# Patient Record
Sex: FEMALE | Race: OTHER | NOT HISPANIC OR LATINO | URBAN - METROPOLITAN AREA
[De-identification: names, ages, dates, MRNs, and addresses within clinical notes are randomized per-mention and may not be internally consistent; named-entity substitution may affect disease eponyms.]

---

## 2021-09-23 ENCOUNTER — INPATIENT (INPATIENT)
Facility: HOSPITAL | Age: 41
LOS: 0 days | Discharge: HOME | DRG: 372 | End: 2021-09-24
Attending: HOSPITALIST | Admitting: STUDENT IN AN ORGANIZED HEALTH CARE EDUCATION/TRAINING PROGRAM
Payer: COMMERCIAL

## 2021-09-23 VITALS
DIASTOLIC BLOOD PRESSURE: 67 MMHG | WEIGHT: 169.09 LBS | OXYGEN SATURATION: 100 % | HEART RATE: 93 BPM | HEIGHT: 62 IN | RESPIRATION RATE: 18 BRPM | SYSTOLIC BLOOD PRESSURE: 121 MMHG | TEMPERATURE: 98 F

## 2021-09-23 DIAGNOSIS — K35.80 UNSPECIFIED ACUTE APPENDICITIS: ICD-10-CM

## 2021-09-23 DIAGNOSIS — F90.9 ATTENTION-DEFICIT HYPERACTIVITY DISORDER, UNSPECIFIED TYPE: ICD-10-CM

## 2021-09-23 DIAGNOSIS — J45.909 UNSPECIFIED ASTHMA, UNCOMPLICATED: ICD-10-CM

## 2021-09-23 DIAGNOSIS — K52.9 NONINFECTIVE GASTROENTERITIS AND COLITIS, UNSPECIFIED: ICD-10-CM

## 2021-09-23 DIAGNOSIS — Z98.890 OTHER SPECIFIED POSTPROCEDURAL STATES: Chronic | ICD-10-CM

## 2021-09-23 DIAGNOSIS — Z29.9 ENCOUNTER FOR PROPHYLACTIC MEASURES, UNSPECIFIED: ICD-10-CM

## 2021-09-23 LAB
ALBUMIN SERPL ELPH-MCNC: 4 G/DL — SIGNIFICANT CHANGE UP (ref 3.3–5)
ALP SERPL-CCNC: 86 U/L — SIGNIFICANT CHANGE UP (ref 40–120)
ALT FLD-CCNC: 15 U/L — SIGNIFICANT CHANGE UP (ref 10–45)
ANION GAP SERPL CALC-SCNC: 10 MMOL/L — SIGNIFICANT CHANGE UP (ref 5–17)
APPEARANCE UR: CLEAR — SIGNIFICANT CHANGE UP
APTT BLD: 28.1 SEC — SIGNIFICANT CHANGE UP (ref 27.5–35.5)
AST SERPL-CCNC: 25 U/L — SIGNIFICANT CHANGE UP (ref 10–40)
BASOPHILS # BLD AUTO: 0.03 K/UL — SIGNIFICANT CHANGE UP (ref 0–0.2)
BASOPHILS NFR BLD AUTO: 0.5 % — SIGNIFICANT CHANGE UP (ref 0–2)
BILIRUB SERPL-MCNC: 0.2 MG/DL — SIGNIFICANT CHANGE UP (ref 0.2–1.2)
BILIRUB UR-MCNC: NEGATIVE — SIGNIFICANT CHANGE UP
BLD GP AB SCN SERPL QL: NEGATIVE — SIGNIFICANT CHANGE UP
BLD GP AB SCN SERPL QL: NEGATIVE — SIGNIFICANT CHANGE UP
BUN SERPL-MCNC: 6 MG/DL — LOW (ref 7–23)
CALCIUM SERPL-MCNC: 9.3 MG/DL — SIGNIFICANT CHANGE UP (ref 8.4–10.5)
CHLORIDE SERPL-SCNC: 103 MMOL/L — SIGNIFICANT CHANGE UP (ref 96–108)
CO2 SERPL-SCNC: 24 MMOL/L — SIGNIFICANT CHANGE UP (ref 22–31)
COLOR SPEC: YELLOW — SIGNIFICANT CHANGE UP
CREAT SERPL-MCNC: 0.74 MG/DL — SIGNIFICANT CHANGE UP (ref 0.5–1.3)
CRP SERPL-MCNC: 38 MG/L — HIGH (ref 0–4)
CULTURE RESULTS: SIGNIFICANT CHANGE UP
DIFF PNL FLD: NEGATIVE — SIGNIFICANT CHANGE UP
EOSINOPHIL # BLD AUTO: 0.04 K/UL — SIGNIFICANT CHANGE UP (ref 0–0.5)
EOSINOPHIL NFR BLD AUTO: 0.6 % — SIGNIFICANT CHANGE UP (ref 0–6)
ERYTHROCYTE [SEDIMENTATION RATE] IN BLOOD: 19 MM/HR — HIGH
GLUCOSE SERPL-MCNC: 116 MG/DL — HIGH (ref 70–99)
GLUCOSE UR QL: NEGATIVE — SIGNIFICANT CHANGE UP
HCG UR QL: NEGATIVE — SIGNIFICANT CHANGE UP
HCT VFR BLD CALC: 38.3 % — SIGNIFICANT CHANGE UP (ref 34.5–45)
HGB BLD-MCNC: 13.3 G/DL — SIGNIFICANT CHANGE UP (ref 11.5–15.5)
IMM GRANULOCYTES NFR BLD AUTO: 0.3 % — SIGNIFICANT CHANGE UP (ref 0–1.5)
INR BLD: 1.03 — SIGNIFICANT CHANGE UP (ref 0.88–1.16)
KETONES UR-MCNC: 40 MG/DL
LACTOFERRIN STL QL IA: POSITIVE
LEUKOCYTE ESTERASE UR-ACNC: NEGATIVE — SIGNIFICANT CHANGE UP
LIDOCAIN IGE QN: 28 U/L — SIGNIFICANT CHANGE UP (ref 7–60)
LYMPHOCYTES # BLD AUTO: 2.54 K/UL — SIGNIFICANT CHANGE UP (ref 1–3.3)
LYMPHOCYTES # BLD AUTO: 41.1 % — SIGNIFICANT CHANGE UP (ref 13–44)
MCHC RBC-ENTMCNC: 32 PG — SIGNIFICANT CHANGE UP (ref 27–34)
MCHC RBC-ENTMCNC: 34.7 GM/DL — SIGNIFICANT CHANGE UP (ref 32–36)
MCV RBC AUTO: 92.3 FL — SIGNIFICANT CHANGE UP (ref 80–100)
MONOCYTES # BLD AUTO: 0.56 K/UL — SIGNIFICANT CHANGE UP (ref 0–0.9)
MONOCYTES NFR BLD AUTO: 9.1 % — SIGNIFICANT CHANGE UP (ref 2–14)
NEUTROPHILS # BLD AUTO: 2.99 K/UL — SIGNIFICANT CHANGE UP (ref 1.8–7.4)
NEUTROPHILS NFR BLD AUTO: 48.4 % — SIGNIFICANT CHANGE UP (ref 43–77)
NITRITE UR-MCNC: NEGATIVE — SIGNIFICANT CHANGE UP
NRBC # BLD: 0 /100 WBCS — SIGNIFICANT CHANGE UP (ref 0–0)
PH UR: 5.5 — SIGNIFICANT CHANGE UP (ref 5–8)
PLATELET # BLD AUTO: 275 K/UL — SIGNIFICANT CHANGE UP (ref 150–400)
POTASSIUM SERPL-MCNC: 3.7 MMOL/L — SIGNIFICANT CHANGE UP (ref 3.5–5.3)
POTASSIUM SERPL-SCNC: 3.7 MMOL/L — SIGNIFICANT CHANGE UP (ref 3.5–5.3)
PROT SERPL-MCNC: 7 G/DL — SIGNIFICANT CHANGE UP (ref 6–8.3)
PROT UR-MCNC: NEGATIVE MG/DL — SIGNIFICANT CHANGE UP
PROTHROM AB SERPL-ACNC: 12.3 SEC — SIGNIFICANT CHANGE UP (ref 10.6–13.6)
RBC # BLD: 4.15 M/UL — SIGNIFICANT CHANGE UP (ref 3.8–5.2)
RBC # FLD: 12.5 % — SIGNIFICANT CHANGE UP (ref 10.3–14.5)
RH IG SCN BLD-IMP: POSITIVE — SIGNIFICANT CHANGE UP
RH IG SCN BLD-IMP: POSITIVE — SIGNIFICANT CHANGE UP
SARS-COV-2 RNA SPEC QL NAA+PROBE: NEGATIVE — SIGNIFICANT CHANGE UP
SODIUM SERPL-SCNC: 137 MMOL/L — SIGNIFICANT CHANGE UP (ref 135–145)
SP GR SPEC: 1.02 — SIGNIFICANT CHANGE UP (ref 1–1.03)
SPECIMEN SOURCE: SIGNIFICANT CHANGE UP
UROBILINOGEN FLD QL: 0.2 E.U./DL — SIGNIFICANT CHANGE UP
WBC # BLD: 6.18 K/UL — SIGNIFICANT CHANGE UP (ref 3.8–10.5)
WBC # FLD AUTO: 6.18 K/UL — SIGNIFICANT CHANGE UP (ref 3.8–10.5)

## 2021-09-23 PROCEDURE — 99223 1ST HOSP IP/OBS HIGH 75: CPT

## 2021-09-23 PROCEDURE — 99252 IP/OBS CONSLTJ NEW/EST SF 35: CPT | Mod: GC

## 2021-09-23 PROCEDURE — 74177 CT ABD & PELVIS W/CONTRAST: CPT | Mod: 26,MG

## 2021-09-23 PROCEDURE — 99285 EMERGENCY DEPT VISIT HI MDM: CPT | Mod: 25

## 2021-09-23 PROCEDURE — 93010 ELECTROCARDIOGRAM REPORT: CPT | Mod: NC

## 2021-09-23 PROCEDURE — 99222 1ST HOSP IP/OBS MODERATE 55: CPT | Mod: GC

## 2021-09-23 PROCEDURE — G1004: CPT

## 2021-09-23 RX ORDER — METRONIDAZOLE 500 MG
500 TABLET ORAL ONCE
Refills: 0 | Status: COMPLETED | OUTPATIENT
Start: 2021-09-23 | End: 2021-09-23

## 2021-09-23 RX ORDER — ACETAMINOPHEN 500 MG
650 TABLET ORAL EVERY 6 HOURS
Refills: 0 | Status: DISCONTINUED | OUTPATIENT
Start: 2021-09-23 | End: 2021-09-24

## 2021-09-23 RX ORDER — ACETAMINOPHEN 500 MG
1000 TABLET ORAL ONCE
Refills: 0 | Status: COMPLETED | OUTPATIENT
Start: 2021-09-23 | End: 2021-09-23

## 2021-09-23 RX ORDER — ALBUTEROL 90 UG/1
1 AEROSOL, METERED ORAL EVERY 6 HOURS
Refills: 0 | Status: DISCONTINUED | OUTPATIENT
Start: 2021-09-23 | End: 2021-09-24

## 2021-09-23 RX ORDER — METRONIDAZOLE 500 MG
500 TABLET ORAL EVERY 8 HOURS
Refills: 0 | Status: DISCONTINUED | OUTPATIENT
Start: 2021-09-23 | End: 2021-09-23

## 2021-09-23 RX ORDER — ONDANSETRON 8 MG/1
4 TABLET, FILM COATED ORAL ONCE
Refills: 0 | Status: COMPLETED | OUTPATIENT
Start: 2021-09-23 | End: 2021-09-23

## 2021-09-23 RX ORDER — LISDEXAMFETAMINE DIMESYLATE 70 MG/1
1 CAPSULE ORAL
Qty: 0 | Refills: 0 | DISCHARGE

## 2021-09-23 RX ORDER — KETOROLAC TROMETHAMINE 30 MG/ML
15 SYRINGE (ML) INJECTION ONCE
Refills: 0 | Status: DISCONTINUED | OUTPATIENT
Start: 2021-09-23 | End: 2021-09-23

## 2021-09-23 RX ORDER — ONDANSETRON 8 MG/1
4 TABLET, FILM COATED ORAL EVERY 8 HOURS
Refills: 0 | Status: DISCONTINUED | OUTPATIENT
Start: 2021-09-23 | End: 2021-09-24

## 2021-09-23 RX ORDER — DEXTROAMPHETAMINE SACCHARATE, AMPHETAMINE ASPARTATE, DEXTROAMPHETAMINE SULFATE AND AMPHETAMINE SULFATE 1.875; 1.875; 1.875; 1.875 MG/1; MG/1; MG/1; MG/1
30 TABLET ORAL
Refills: 0 | Status: DISCONTINUED | OUTPATIENT
Start: 2021-09-24 | End: 2021-09-24

## 2021-09-23 RX ORDER — LISDEXAMFETAMINE DIMESYLATE 70 MG/1
70 CAPSULE ORAL
Refills: 0 | Status: DISCONTINUED | OUTPATIENT
Start: 2021-09-23 | End: 2021-09-23

## 2021-09-23 RX ORDER — CEFTRIAXONE 500 MG/1
1000 INJECTION, POWDER, FOR SOLUTION INTRAMUSCULAR; INTRAVENOUS EVERY 24 HOURS
Refills: 0 | Status: DISCONTINUED | OUTPATIENT
Start: 2021-09-24 | End: 2021-09-24

## 2021-09-23 RX ORDER — METRONIDAZOLE 500 MG
500 TABLET ORAL EVERY 8 HOURS
Refills: 0 | Status: DISCONTINUED | OUTPATIENT
Start: 2021-09-23 | End: 2021-09-24

## 2021-09-23 RX ORDER — IOHEXOL 300 MG/ML
30 INJECTION, SOLUTION INTRAVENOUS ONCE
Refills: 0 | Status: COMPLETED | OUTPATIENT
Start: 2021-09-23 | End: 2021-09-23

## 2021-09-23 RX ORDER — SODIUM CHLORIDE 9 MG/ML
1000 INJECTION INTRAMUSCULAR; INTRAVENOUS; SUBCUTANEOUS ONCE
Refills: 0 | Status: COMPLETED | OUTPATIENT
Start: 2021-09-23 | End: 2021-09-23

## 2021-09-23 RX ORDER — FAMOTIDINE 10 MG/ML
20 INJECTION INTRAVENOUS ONCE
Refills: 0 | Status: COMPLETED | OUTPATIENT
Start: 2021-09-23 | End: 2021-09-23

## 2021-09-23 RX ORDER — ALBUTEROL 90 UG/1
2 AEROSOL, METERED ORAL
Qty: 0 | Refills: 0 | DISCHARGE

## 2021-09-23 RX ORDER — CEFTRIAXONE 500 MG/1
1000 INJECTION, POWDER, FOR SOLUTION INTRAMUSCULAR; INTRAVENOUS ONCE
Refills: 0 | Status: COMPLETED | OUTPATIENT
Start: 2021-09-23 | End: 2021-09-23

## 2021-09-23 RX ADMIN — SODIUM CHLORIDE 1000 MILLILITER(S): 9 INJECTION INTRAMUSCULAR; INTRAVENOUS; SUBCUTANEOUS at 01:53

## 2021-09-23 RX ADMIN — IOHEXOL 30 MILLILITER(S): 300 INJECTION, SOLUTION INTRAVENOUS at 01:53

## 2021-09-23 RX ADMIN — Medication 500 MILLIGRAM(S): at 21:47

## 2021-09-23 RX ADMIN — ONDANSETRON 4 MILLIGRAM(S): 8 TABLET, FILM COATED ORAL at 01:53

## 2021-09-23 RX ADMIN — Medication 15 MILLIGRAM(S): at 02:54

## 2021-09-23 RX ADMIN — Medication 15 MILLIGRAM(S): at 01:53

## 2021-09-23 RX ADMIN — Medication 100 MILLIGRAM(S): at 04:39

## 2021-09-23 RX ADMIN — SODIUM CHLORIDE 1000 MILLILITER(S): 9 INJECTION INTRAMUSCULAR; INTRAVENOUS; SUBCUTANEOUS at 02:54

## 2021-09-23 RX ADMIN — FAMOTIDINE 20 MILLIGRAM(S): 10 INJECTION INTRAVENOUS at 01:53

## 2021-09-23 RX ADMIN — CEFTRIAXONE 100 MILLIGRAM(S): 500 INJECTION, POWDER, FOR SOLUTION INTRAMUSCULAR; INTRAVENOUS at 04:39

## 2021-09-23 RX ADMIN — Medication 400 MILLIGRAM(S): at 07:01

## 2021-09-23 RX ADMIN — Medication 100 MILLIGRAM(S): at 14:10

## 2021-09-23 NOTE — ED ADULT NURSE NOTE - CHIEF COMPLAINT QUOTE
RLQ pain Sunday of this week, with diarrhea Monday and loss of appetite Sunday night, pt went to ER in [place]  started keflex yesterday and buscopan for pain

## 2021-09-23 NOTE — ED ADULT TRIAGE NOTE - CHIEF COMPLAINT QUOTE
RLQ pain Sunday of this week, with diarrhea Monday and loss of appetite Sunday night, pt went to ER in [place]  started keflex yesterday and buscopan for pain

## 2021-09-23 NOTE — H&P ADULT - ATTENDING COMMENTS
Patient was seen and examined at 11 am. She reports significant improvement of her symptoms. Denies N/V. Abdominal pain has improved, still has residual RLQ pain. Denies fever, chills, SOB, CP. Diarrhea has resolved. ROS is otherwise negative. Vitals, labwork and pertinent imaging reviewed - normal WBC count, afebrile, inflammatory markers mildly elevated, CT A/P as above. Physical exam - NAD, AAO x 4, PERRLA, EOMI, MMM, supple neck, chest - CTA b/l, CV - rrr, s1s2, no m/r/g, abd - soft, ND, mild TTP @ RLQ w/ deep palpation, negative Jimenez's sign, + BS, ext - wwp, psych - normal affect    Plan - suspect gastroenteritis vs infectious diarrhea, c/w abx as per GI; advance diet as tolerated

## 2021-09-23 NOTE — H&P ADULT - PROBLEM SELECTOR PLAN 1
Pt experiencing 4 days acute abdominal pain with diarrhea during recent travel to [place]. CT A&P showed findings concerning for acute appendicitis and colitis. Surgery assessed pt and deemed no acute intervention needed at this time. Based on 10 year history of intermittent abdominal pain accompanied by constipation, diarrhea, and bloody stool there is also concern for IBD.   - f/u GI PCR, C diff studies, stool calprotectin, fecal lactoferrin  - f/u ESR, CRP  - c/w Flagyl and CTX  - GI consulted, appreciate recs  - Tylenol PRN for pain  - Zofran PRN for nausea Pt experiencing 4 days acute abdominal pain with diarrhea during recent travel to [place]. CT A&P showed findings concerning for acute appendicitis and colitis. Surgery assessed pt and deemed symptoms more likely colitis not appendicitis. Based on 10 year history of intermittent abdominal pain accompanied by constipation, diarrhea, and bloody stool there is also concern for IBD. Lower concern for appendicitis but will manage medically.   - f/u GI PCR, C diff studies, stool calprotectin, fecal lactoferrin  - f/u ESR, CRP  - c/w Flagyl and CTX to medically manage appendicitis  - GI consulted, appreciate recs  - Tylenol PRN for pain  - Zofran PRN for nausea

## 2021-09-23 NOTE — H&P ADULT - HISTORY OF PRESENT ILLNESS
INCOMPLETE NOTE    41F with PMH of asthma and ADHD presents for abdominal pain.    INCOMPLETE NOTE    41F with PMH of asthma and ADHD presents for abdominal pain.   k INCOMPLETE NOTE    41F with PMH of asthma, ADHD, and fibromyalgia presents for abdominal pain. Her symptoms began 4 days ago while she was in [place]. The pain is located in the LLQ and does not radiate. She also experienced multiple episodes of watery diarrhea. She took imodium but it did not help. She went to the hospital in [place] where she was diagnosed with acute appendicitis and was advised to pursue surgery. [place] the [place] for [place].    INCOMPLETE NOTE    41F with PMH of asthma, ADHD, and fibromyalgia presents for abdominal pain. Her symptoms began 4 days ago while she was in [place]. The pain is located in the LLQ and does not radiate. She also experienced multiple episodes of watery diarrhea. She took imodium but it did not help. She went to the hospital in [place] where she was diagnosed with acute appendicitis and was advised to pursue surgery. [place] opted [place] the [place] for [place]. She also experienced nausea, anorexia, dizziness, chills. She     In the ED:  Initial vital signs: T: 98.6 F, HR: 87, BP: 130/68, R: 17, SpO2: 100% on RA  ED course:   Labs: no significant labs  Imaging:  CT A&P: inflamed tail end of the appendix suspicious for acute early appendicitis. Thickened terminalileum, possible ileitis. Findings consistent with colitis most apparent in the ascending colon.  EKG: NSR  Internventions: 2L NS, CTX, Flagyl    INCOMPLETE NOTE    41F with PMH of asthma, ADHD, and fibromyalgia presents for abdominal pain. Her symptoms began 4 days ago while she was in [place]. The pain is located in the LLQ and does not radiate. She also experienced multiple episodes of watery diarrhea. She took imodium but it did not help. She did not eat any foods which she feels triggered her pain. She went to the hospital in [place] where she was diagnosed with acute appendicitis and was advised to pursue surgery. [place] the [place]. She also experienced nausea, anorexia, dizziness, chills, fatigue. She has had intermittent, crampy abdominal pain for about 10 years. She usually experiences 1 day of abdominal pain which is relieved after she has watery bowel movements. She has noticed blood in the stool in the past and she feels it is related to have hard stools/constipation. In 8/21 she noticed blood in the toilet bowel and this prompted her to seek out an appointment with GI on 9/29.     In the ED:  Initial vital signs: T: 98.6 F, HR: 87, BP: 130/68, R: 17, SpO2: 100% on RA  ED course:   Labs: no significant labs  Imaging:  CT A&P: inflamed tail end of the appendix suspicious for acute early appendicitis. Thickened terminal ileum, possible ileitis. Findings consistent with colitis most apparent in the ascending colon.  EKG: NSR  Internventions: 2L NS, CTX, Flagyl    INCOMPLETE NOTE    41F with PMH of asthma, ADHD, and fibromyalgia presents for abdominal pain. Her symptoms began 4 days ago while she was in [place]. The pain is located in the RLQ and does not radiate. She also experienced multiple episodes of watery diarrhea. She took imodium but it did not help. She did not eat any foods which she feels triggered her pain. She went to the hospital in [place] where she was diagnosed with acute appendicitis and was advised to pursue surgery. [place] the [place] She also experienced nausea, anorexia, dizziness, chills, fatigue. She has had intermittent, crampy abdominal pain for about 10 years. She usually experiences 1 day of abdominal pain which is relieved after she has watery bowel movements. She has noticed blood in the stool in the past and she feels it is related to have hard stools/constipation. In 8/21 she noticed blood in the toilet bowel and this prompted her to seek out an appointment with GI on 9/29.     In the ED:  Initial vital signs: T: 98.6 F, HR: 87, BP: 130/68, R: 17, SpO2: 100% on RA  ED course:   Labs: no significant labs  Imaging:  CT A&P: inflamed tail end of the appendix suspicious for acute early appendicitis. Thickened terminal ileum, possible ileitis. Findings consistent with colitis most apparent in the ascending colon.  EKG: NSR  Internventions: 2L NS, CTX, Flagyl    INCOMPLETE NOTE    41F with PMH of asthma, ADHD, and fibromyalgia presents for abdominal pain. Her symptoms began 4 days ago while she was in [place]. The pain is located in the RLQ and does not radiate. She also experienced multiple episodes of watery diarrhea. She took imodium but it did not help. She did not eat any foods which she feels triggered her pain and diarrhea. She also experienced nausea, anorexia, dizziness, chills, fatigue. She went to the hospital in [place] where she was diagnosed with acute appendicitis and was advised to pursue surgery. [place] opted [place] the [place] and came straight to [facility] ED.  She has had intermittent, crampy abdominal pain for about 10 years. She usually experiences 1 day of abdominal pain which is relieved after she has watery bowel movements. She has noticed blood in the stool in the past and she feels it is related to have hard stools/constipation. In 8/21 she noticed blood in the toilet bowel and this prompted her to seek out an appointment with GI on 9/29. She denies joint pains, rashes, fevers, SOB, chest pain.    In the ED:  Initial vital signs: T: 98.6 F, HR: 87, BP: 130/68, R: 17, SpO2: 100% on RA  ED course:   Labs: no significant labs  Imaging:  CT A&P: inflamed tail end of the appendix suspicious for acute early appendicitis. Thickened terminal ileum, possible ileitis. Findings consistent with colitis most apparent in the ascending colon.  EKG: NSR  Internventions: 2L NS, CTX, Flagyl    INCOMPLETE NOTE    41F with PMH of asthma, ADHD, and fibromyalgia presents for abdominal pain. Her symptoms began 4 days ago while she was in [place]. The pain is located in the RLQ and does not radiate. She also experienced multiple episodes of watery diarrhea. She took imodium but it did not help. She did not eat any foods which she feels triggered her pain and diarrhea. She also experienced nausea, anorexia, dizziness, chills, fatigue. She went to the hospital in [place] where she was diagnosed with acute appendicitis and was advised to pursue surgery. [place] opted [place] the [place] and came straight to [facility] ED.  She has had intermittent, crampy abdominal pain for about 10 years. She usually experiences 1 day of abdominal pain which is relieved after she has watery bowel movements. She has noticed blood in the stool in the past and she feels it is related to have hard stools/constipation. In 8/21 she noticed blood in the toilet bowel and this prompted her to seek out an appointment with GI on 9/29. She denies joint pains, rashes, fevers, SOB, chest pain.    In the ED:  Initial vital signs: T: 98.6 F, HR: 87, BP: 130/68, R: 17, SpO2: 100% on RA  ED course:   Labs: no WBC, no neutrophilia  Imaging:  CT A&P: inflamed tail end of the appendix suspicious for acute early appendicitis. Thickened terminal ileum, possible ileitis. Findings consistent with colitis most apparent in the ascending colon.  EKG: NSR with QTc WNL  Internventions: 2L NS, CTX, Flagyl    INCOMPLETE NOTE    41F with PMH of asthma, ADHD, and fibromyalgia presents for abdominal pain. Her symptoms began 4 days ago while she was in [place]. The pain is located in the RLQ and does not radiate. She also experienced multiple episodes of watery diarrhea. She took imodium but it did not help. She did not eat any foods which she feels triggered her pain and diarrhea. She also experienced nausea, anorexia, dizziness, chills, fatigue. She went to the hospital in [place] where she was diagnosed with acute appendicitis and was advised to pursue surgery. [place] opted [place] the [place] and came straight to [facility] ED.  She has had intermittent, crampy abdominal pain for about 10 years. She usually experiences 1 day of abdominal pain which is relieved after she has watery bowel movements. She has noticed blood in the stool in the past and she feels it is related to having hard stools/constipation. In 8/21 she noticed blood in the toilet bowl and this prompted her to seek out an appointment with GI on 9/29. She denies joint pains, rashes, fevers, SOB, chest pain.    In the ED:  Initial vital signs: T: 98.6 F, HR: 87, BP: 130/68, R: 17, SpO2: 100% on RA  ED course:   Labs: no WBC, no neutrophilia  Imaging:  CT A&P: inflamed tail end of the appendix suspicious for acute early appendicitis. Thickened terminal ileum, possible ileitis. Findings consistent with colitis most apparent in the ascending colon.  EKG: NSR with QTc WNL  Internventions: 2L NS, CTX, Flagyl    41F with PMH of asthma, ADHD, and fibromyalgia presents for abdominal pain. Her symptoms began 4 days ago while she was in [place]. The pain is located in the RLQ and does not radiate. She also experienced multiple episodes of watery diarrhea. She took imodium but it did not help. She did not eat any foods which she feels triggered her pain and diarrhea. She also experienced nausea, anorexia, dizziness, chills, fatigue. She went to the hospital in [place] where she was diagnosed with acute appendicitis and was advised to pursue surgery. [place] opted [place] the [place] and came straight to [facility] ED.  She has had intermittent, crampy abdominal pain for about 10 years. She usually experiences 1 day of abdominal pain which is relieved after she has watery bowel movements. She has noticed blood in the stool in the past and she feels it is related to having hard stools/constipation. In 8/21 she noticed blood in the toilet bowl and this prompted her to seek out an appointment with GI on 9/29. She denies joint pains, rashes, fevers, SOB, chest pain.    In the ED:  Initial vital signs: T: 98.6 F, HR: 87, BP: 130/68, R: 17, SpO2: 100% on RA  ED course:   Labs: no WBC, no neutrophilia  Imaging:  CT A&P: inflamed tail end of the appendix suspicious for acute early appendicitis. Thickened terminal ileum, possible ileitis. Findings consistent with colitis most apparent in the ascending colon.  EKG: NSR with QTc WNL  Internventions: 2L NS, CTX, Flagyl

## 2021-09-23 NOTE — ED PROVIDER NOTE - CARE PLAN
Principal Discharge DX:	Acute appendicitis with localized peritonitis  Secondary Diagnosis:	Colitis   1

## 2021-09-23 NOTE — ED PROVIDER NOTE - CLINICAL SUMMARY MEDICAL DECISION MAKING FREE TEXT BOX
RLQ abd pain, nausea, diarrhea, travelled from [place]  -check labs, ua  -ivf  -zofran, toradol, pepcid  -CT a/p

## 2021-09-23 NOTE — DISCHARGE NOTE PROVIDER - NSDCMRMEDTOKEN_GEN_ALL_CORE_FT
Albuterol (Eqv-ProAir HFA) 90 mcg/inh inhalation aerosol: 2 puff(s) inhaled every 6 hours, As Needed  Vyvanse 70 mg oral capsule: 1 cap(s) orally once a day (in the morning)   Albuterol (Eqv-ProAir HFA) 90 mcg/inh inhalation aerosol: 2 puff(s) inhaled every 6 hours, As Needed  cefpodoxime 200 mg oral tablet: 1 tab(s) orally 2 times a day (after meals)   metroNIDAZOLE 500 mg oral tablet: 1 tab(s) orally 2 times a day (after meals)   Vyvanse 70 mg oral capsule: 1 cap(s) orally once a day (in the morning)   Albuterol (Eqv-ProAir HFA) 90 mcg/inh inhalation aerosol: 2 puff(s) inhaled every 6 hours, As Needed  ciprofloxacin 500 mg oral tablet: 1 tab(s) orally 2 times a day   Vyvanse 70 mg oral capsule: 1 cap(s) orally once a day (in the morning)

## 2021-09-23 NOTE — H&P ADULT - ASSESSMENT
41F with PMH of asthma and ADHD presented for LLQ abdominal pain most likely 2/2 inflammatory bowel disease. 41F with PMH of asthma and ADHD presented for LLQ abdominal pain most likely acute colitis either viral or bacterial colitis i/s/o recent travel with concern for inflammatory bowel disease based on imaging and 10 year history of intermittent abdominal pain accompanied by constipation, diarrhea, and bloody stool.  41F with PMH of asthma and ADHD presented for LLQ abdominal pain most likely acute colitis either viral or bacterial colitis i/s/o recent travel with concern for inflammatory bowel disease based on imaging and 10 year history of intermittent abdominal pain accompanied by constipation, diarrhea, and bloody stool. Lower concern for appendicitis. 41F with PMH of asthma and ADHD presented for RLQ abdominal pain most likely acute colitis either viral or bacterial colitis i/s/o recent travel with concern for chronic inflammatory bowel disease based on imaging and 10 year history of intermittent abdominal pain accompanied by constipation, diarrhea, and bloody stool. Lower concern for appendicitis.

## 2021-09-23 NOTE — H&P ADULT - NSICDXPASTMEDICALHX_GEN_ALL_CORE_FT
Nexplanon Removal and Reinsertion    No LMP recorded. Patient has had an implant.    Date of procedure:  9/12/2019    Risks and benefits discussed? yes  All questions answered? yes  Consents given by the patient  Written consent obtained? yes    Local anesthesia used:  yes - 1.5 cc's of  Meds; anesthesia local: 1% lidocaine with epinephrine    Procedure documentation:    The upper left arm (non-dominant) was marked at the intended site of removal.  Betadine was used to cleanse the skin.  Local anesthesia was injected.  A vertical incision was created at the distal tip of the implant.  The implant was removed intact without difficulty.    The new Nexplanon was placed subdermally without difficulty through the same incision used to remove the prior implant.  The devise was able to be palpated in the arm by both myself and Hiral.  Steri-strips were then placed across the site of insertion and the arm was wrapped.    She tolerated the procedure well.  There were no complications.  EBL was minimal.    Post procedure instructions: Remove the wrapping in 24 hours and the steri-strips in 5 days.    Follow up needed: PRN    This note was electronically signed.    Deepa Mercer PA-C  September 12, 2019       PAST MEDICAL HISTORY:  ADHD     Asthma

## 2021-09-23 NOTE — ED ADULT NURSE NOTE - OBJECTIVE STATEMENT
Received a 41 year old female with a chief complaint of abdominal pain with nausea, vomiting and diarrhea.

## 2021-09-23 NOTE — H&P ADULT - NSHPPHYSICALEXAM_GEN_ALL_CORE
.  VITAL SIGNS:  T(C): 37 (09-23-21 @ 07:04), Max: 37 (09-23-21 @ 07:04)  T(F): 98.6 (09-23-21 @ 07:04), Max: 98.6 (09-23-21 @ 07:04)  HR: 87 (09-23-21 @ 07:04) (87 - 93)  BP: 130/68 (09-23-21 @ 07:04) (121/67 - 130/68)  BP(mean): --  RR: 17 (09-23-21 @ 07:04) (17 - 18)  SpO2: 100% (09-23-21 @ 07:04) (100% - 100%)  Wt(kg): --    PHYSICAL EXAM:    Constitutional: WDWN resting comfortably in bed; NAD  Head: NC/AT  Eyes: PERRL, EOMI, anicteric sclera  ENT: no nasal discharge; uvula midline, no oropharyngeal erythema or exudates; MMM  Neck: supple; no JVD or thyromegaly  Respiratory: CTA B/L; no W/R/R, no retractions  Cardiac: +S1/S2; RRR; no M/R/G; PMI non-displaced  Gastrointestinal: abdomen soft, NT/ND; no rebound or guarding; +BSx4  Back: spine midline, no bony tenderness or step-offs; no CVAT B/L  Extremities: WWP, no clubbing or cyanosis; no peripheral edema  Musculoskeletal: NROM x4; no joint swelling, tenderness or erythema  Vascular: 2+ radial, femoral, DP/PT pulses B/L  Dermatologic: skin warm, dry and intact; no rashes, wounds, or scars  Lymphatic: no submandibular or cervical LAD  Neurologic: AAOx3; CNII-XII grossly intact; no focal deficits  Psychiatric: affect and characteristics of appearance, verbalizations, behaviors are appropriate .  VITAL SIGNS:  T(C): 37 (09-23-21 @ 07:04), Max: 37 (09-23-21 @ 07:04)  T(F): 98.6 (09-23-21 @ 07:04), Max: 98.6 (09-23-21 @ 07:04)  HR: 87 (09-23-21 @ 07:04) (87 - 93)  BP: 130/68 (09-23-21 @ 07:04) (121/67 - 130/68)  BP(mean): --  RR: 17 (09-23-21 @ 07:04) (17 - 18)  SpO2: 100% (09-23-21 @ 07:04) (100% - 100%)  Wt(kg): --    PHYSICAL EXAM:    Constitutional: resting comfortably in bed; NAD  Head: NC/AT  Eyes: PERRL, EOMI, anicteric sclera  ENT: no nasal discharge; no oropharyngeal erythema or exudates; MMM  Neck: supple  Respiratory: CTA B/L; no W/R/R, no retractions  Cardiac: +S1/S2; RRR; no M/R/G  Gastrointestinal: abdomen soft, non-distended; mildly tender in RLQ on deep palpation; no rebound or guarding; +BSx4  Extremities: no peripheral edema  Vascular: 2+ radial, DP/PT pulses B/L  Dermatologic: skin warm, dry and intact  Neurologic: AAOx3  Psychiatric: affect and characteristics of appearance, verbalizations, behaviors are appropriate

## 2021-09-23 NOTE — DISCHARGE NOTE PROVIDER - NSDCCPCAREPLAN_GEN_ALL_CORE_FT
PRINCIPAL DISCHARGE DIAGNOSIS  Diagnosis: Acute colitis  Assessment and Plan of Treatment:       SECONDARY DISCHARGE DIAGNOSES  Diagnosis: Colitis  Assessment and Plan of Treatment:      PRINCIPAL DISCHARGE DIAGNOSIS  Diagnosis: Acute colitis  Assessment and Plan of Treatment: You were found to have infectious diarrhea. Inflammation of the colon caused by either a virus or bacteria. Bacteria or viruses are transmitted to you via food or water sources. Symptoms include fluid hydration, diarrhea, belly pain, and possible fever.Treatment includes antibiotics. Please take the following medications with food:  Metronidazole 500mg please take every 12 hours by mouth for 5 days  Cefpoxodime 200mg please take every 12 hours by mouth for 5 days         PRINCIPAL DISCHARGE DIAGNOSIS  Diagnosis: Acute colitis  Assessment and Plan of Treatment: You were found to have colitis. This is an inflammation of the colon and can be caused by either a virus or bacteria. Bacteria or viruses are transmitted to you via contaminated food or water sources. Symptoms include diarrhea, belly pain, nausea, nausea, vomiting, and fever. Treatment includes hydration with fluids and antibiotics. Please take the following medications with food:  Metronidazole 500mg please take every 12 hours by mouth for 5 days  Cefpoxodime 200mg please take every 12 hours by mouth for 5 days         PRINCIPAL DISCHARGE DIAGNOSIS  Diagnosis: Acute colitis  Assessment and Plan of Treatment: You were found to have colitis. This is an inflammation of the colon and can be caused by either a virus or bacteria. Bacteria or viruses are transmitted to you via contaminated food or water sources. Symptoms include diarrhea, belly pain, nausea, nausea, vomiting, and fever. Treatment includes hydration with fluids and antibiotics. Please take the following medications with food:  Ciprofloxacin 500mg 1 tab by mouth every 12 hours for 5 days (9/24-9/28).

## 2021-09-23 NOTE — CONSULT NOTE ADULT - ATTENDING COMMENTS
CRS Attending  Patient seen and examined on rounds  Discussed with senior resident Dr Faulkner at time of consultation  Since admission patient feels improved  Afebrile  Pain decreased  Sitting comfortably in bed  Abdomen soft nondistended nontender  CT reviewed with patient. Current symptoms and prior episodes of symptoms discussed with patient. Underlying causes of colitis discussed with patient, including inflammatory bowel disease and infectious colitis.  Continue supportive care  GI input appreciated. Importance of colonoscopy discussed with patient, she expressed understanding.  Continue care as per primary team, will continue to follow as consult while inpatient.

## 2021-09-23 NOTE — DISCHARGE NOTE PROVIDER - PROVIDER TOKENS
FREE:[LAST:[Consuelo],FIRST:[Clara],PHONE:[phone]],FAX:[(   )    -],ESTABLISHEDPATIENT:[T]],FREE:[LAST:[Renato],FIRST:[Corey],PHONE:[phone]],FAX:[(   )    -],SCHEDULEDAPPT:[09/29/2021]]

## 2021-09-23 NOTE — CONSULT NOTE ADULT - ASSESSMENT
41F with pmh of asthma and ADHD and no significant psh who presents to [facility] with 4 day history of RLQ with associated anorexia, nausea, and multiple loose stools.In the ED initial vitals were all wnl. Initial labs were also wnl. CT A/P notable for inflamed tail end of the appendix suspicious for acute early appendicitis. Thickened terminalileum, possible ileitis. Findings consistent with colitis most apparent in the ascending colon. Of note, pt reports ongoing workup for IBS d/t intermittent abdominal cramping along with changes in bowel movement and was to undergo colonoscopy on 9/29. Clinical and radiographic findings c/w Crohn's rather than early acute appendicitis given h/o of vague intermittent crampy abdominal pain along with terminal ileitis and possible colitis of the ascending colon.     No acute surgical intervention   Recommend medicine admission and GI consult for further IBD w/u  Analgesics PRN  Patient seen and discussed with chief surgery resident  General surgery team 1C will continue to follow  Please call  with any questions      
41F with pmh of asthma and ADHD and no significant psh who presents to [facility] with 4 day history of RLQ with associated anorexia, nausea, and multiple loose stools.In the ED initial vitals were all wnl. Initial labs were also wnl. CT A/P notable for inflamed tail end of the appendix suspicious for acute early appendicitis. Thickened terminalileum, possible ileitis. Findings consistent with colitis most apparent in the ascending colon. Of note, pt reports ongoing workup for IBS d/t intermittent abdominal cramping along with changes in bowel movement and was to undergo colonoscopy on 9/29. Clinical and radiographic findings c/w Crohn's rather than early acute appendicitis given h/o of vague intermittent crampy abdominal pain along with terminal ileitis and possible colitis of the ascending colon.     No acute surgical intervention   Recommend medicine admission and GI consult for further IBD w/u  Analgesics PRN  Patient seen and discussed with chief surgery resident  General surgery team 4C will continue to follow  Please call  with any questions  
41F with PMHx of asthma, ADHD, and fibromyalgia presents for abdominal pain and diarrhea.    #Abd pain and Diarrhea  Patient noted overall improvement since arriving in the ED.  Image finding with wall thickening of the ascending colon, cecum and terminal ileum.  Given recent travel and improvement on Abx/IVF, suspect infectious colitis and would continue supportive management.  As patient with minimal symptoms now, would agree with plan for outpatient workup given scheduled colonoscopy on 9/29/21 with her outpatient GI.    -Continue supportive management  -Advanced diet to bland or BRAT diet as tolerated  -Continue abx for 5-7 days  -Outpatient followup for her scheduled colonoscopy     Recommendations d/w primary team  Case d/w svc attg

## 2021-09-23 NOTE — ED PROVIDER NOTE - PROGRESS NOTE DETAILS
tip appendicitis, colitis on CT. surgery consulted, will give ceft/flagyl pending surg recs pt seen by surgery - recommend admission to medicine for possible crohns.  GI paged

## 2021-09-23 NOTE — DISCHARGE NOTE PROVIDER - HOSPITAL COURSE
#Discharge: do not delete    Patient is 42 yo F with past medical history of asthma, ADHD, fibromyalgia presented with abdominal pain, found to have colitis most likely viral or bacterial during travel in [place].     Hospital course:     #Colitis  Pt experiencing 4 days acute abdominal pain with diarrhea during recent travel to [place]. CT A&P showed findings concerning for acute appendicitis and colitis. Surgery assessed pt and deemed symptoms more likely colitis not appendicitis. Based on 10 year history of intermittent abdominal pain accompanied by constipation, diarrhea, and bloody stool there is also concern for IBD. Lower concern for appendicitis as pt greatly improving clinically with no WBC count and no fever.   - start Cefpodoxime 200mg BID for 5 days starting 9/24/21  - start Metronidazole 500mg BID for 5 days starting 9/24/21  - advance diet as tolerated  - outpatient follow up with GI    #Asthma  - continue with home medication Albuterol as needed    #ADHD  - continue with home medication Vyvanse 70mg daily    Patient was discharged to: home    New medications:     Cefpodoxime 200mg BID for 5 days starting 9/24/21  Metronidazole 500mg BID for 5 days starting 9/24/21    Changes to old medications: None  Medications that were stopped: None    Items to follow up as outpatient:    Follow up visit with primary care provider Clara Cordero NP within 2 weeks  Follow up visit with gastroenterologist Corey Gross MD on 9/29/21    Physical exam at the time of discharge:    Constitutional: resting comfortably in bed; NAD  Head: NC/AT  Eyes: PERRL, EOMI, anicteric sclera  ENT: no nasal discharge; no oropharyngeal erythema or exudates; MMM  Neck: supple  Respiratory: CTA B/L; no W/R/R, no retractions  Cardiac: +S1/S2; RRR; no M/R/G  Gastrointestinal: abdomen soft, non-distended; mildly tender in RLQ on deep palpation; no rebound or guarding; +BSx4  Extremities: no peripheral edema  Vascular: 2+ radial, DP/PT pulses B/L  Dermatologic: skin warm, dry and intact  Neurologic: AAOx3  Psychiatric: affect and characteristics of appearance, verbalizations, behaviors are appropriate       #Discharge: do not delete    Patient is 40 yo F with past medical history of asthma, ADHD, fibromyalgia presented with abdominal pain, found to have colitis most likely viral or bacterial during travel in [place].     Hospital course:     #Colitis  Pt experiencing 4 days acute abdominal pain with diarrhea during recent travel to [place]. CT A&P showed findings concerning for acute appendicitis and colitis. Surgery assessed pt and deemed symptoms more likely colitis not appendicitis. Based on 10 year history of intermittent abdominal pain accompanied by constipation, diarrhea, and bloody stool there is also concern for IBD. Lower concern for appendicitis as pt greatly improving clinically with no WBC count and no fever.   - start Cefpodoxime 200mg BID for 5 days starting 9/24/21  - start Metronidazole 500mg BID for 5 days starting 9/24/21  - advance diet as tolerated  - outpatient follow up with GI    #Asthma  - continue with home medication Albuterol as needed    #ADHD  - continue with home medication Vyvanse 70mg daily    Patient was discharged to: home    New medications:     Cefpodoxime 200mg BID for 5 days starting 9/24/21  Metronidazole 500mg BID for 5 days starting 9/24/21    Changes to old medications: None  Medications that were stopped: None    Items to follow up as outpatient:    Follow up visit with primary care provider Clara Cordero NP within 2 weeks  Follow up visit with Corey Gross MD on 9/29/21    Physical exam at the time of discharge:    Constitutional: resting comfortably in bed; NAD  Head: NC/AT  Eyes: PERRL, EOMI, anicteric sclera  ENT: no nasal discharge; no oropharyngeal erythema or exudates; MMM  Neck: supple  Respiratory: CTA B/L; no W/R/R, no retractions  Cardiac: +S1/S2; RRR; no M/R/G  Gastrointestinal: abdomen soft, non-distended; mildly tender in RLQ on deep palpation; no rebound or guarding; +BSx4  Extremities: no peripheral edema  Vascular: 2+ radial, DP/PT pulses B/L  Dermatologic: skin warm, dry and intact  Neurologic: AAOx3  Psychiatric: affect and characteristics of appearance, verbalizations, behaviors are appropriate       Patient is 42 yo F with past medical history of asthma, ADHD, fibromyalgia, recent travel to [place] presented with abdominal pain, found to have colitis on CT, stool PCR positive for salmonella, patient tolerating soft bland diet without nausea or vomiting, diarrhea improved only two episodes since hospital.     #Colitis. Pt experiencing 4 days acute abdominal pain with 10 episodes/day of water diarrhea during recent travel to [place] secondary to infectious colitis.  CT A&P concerning for colitis, stool PCR positive for salmonella, patient was started on ceftriaxone and flagyl. Patient tolerating soft bland diet without nausea or vomiting, diarrhea improved only two episodes of soft stool since yesterday. Continue Cefpodoxime 200mg BID for 5 days (9/24-9/28) and Metronidazole 500mg BID for 5 days (9/24-9/28).  Patient has an outpatient follow up with GI.      # Acute appendicitis.  CT A&P concerning for appendicitis, patient afebrile, no leukocytosis.  Surgery assessed patient and deemed symptoms more likely colitis not appendicitis.  Patient was provided images from her scan and has followup with her gastroenterologist on 9/29.    # Chronic abdominal pain with intermittent diarrhea.  Given patient's 10 year history of intermittent abdominal pain accompanied by constipation, diarrhea, and bloody stool there is also concern for IBD. Patient has gastroenterologist with followup appointment on 9/29.    #Asthma. Patient with history of asthma, no wheezing on exam, saturating well on room air.  Continue with home medication Albuterol as needed    #ADHD. Patient with history of ADHD, continue home medication.    Patient was discharged to: home  Changes to old medications: None  Medications that were stopped: None    New medications:   Cefpodoxime 200mg BID for 5 days starting 9/24/21  Metronidazole 500mg BID for 5 days starting 9/24/21    Follow up visit with primary care provider Clara Cordero NP within 2 weeks  Follow up visit with Corey Gross MD on 9/29/21    VITALS:   T(C): 36.5 (09-24-21 @ 08:45), Max: 36.9 (09-24-21 @ 06:00)  HR: 91 (09-24-21 @ 08:45) (62 - 91)  BP: 110/57 (09-24-21 @ 08:45) (90/57 - 111/76)  RR: 18 (09-24-21 @ 08:45) (18 - 20)  SpO2: 98% (09-24-21 @ 08:45) (94% - 100%)    GENERAL: NAD, lying in bed comfortably  HEAD:  Atraumatic, normocephalic  EYES: EOMI, PERRLA, conjunctiva and sclera clear  ENT: Moist mucous membranes  NECK: Supple, no JVD  HEART: Regular rate and rhythm, no murmurs, rubs, or gallops  LUNGS: Unlabored respirations.  Clear to auscultation bilaterally, no crackles, wheezing, or rhonchi  ABDOMEN: Soft, tender right abdomen,  no RLQ tenderness, Rovsing negative, nondistended, +BS  EXTREMITIES: 2+ peripheral pulses bilaterally. No clubbing, cyanosis, or edema  NERVOUS SYSTEM:  A&Ox3, no focal deficits   SKIN: No rashes or lesions Patient is 40 yo F with past medical history of asthma, ADHD, fibromyalgia, recent travel to [place] presented with abdominal pain, found to have colitis on CT, stool PCR positive for salmonella, patient tolerating soft bland diet without nausea or vomiting, diarrhea improved only two episodes since hospital.     #Colitis. Pt experiencing 4 days acute abdominal pain with 10 episodes/day of water diarrhea during recent travel to [place] secondary to infectious colitis.  CT A&P concerning for colitis, stool PCR positive for salmonella, patient was started on ceftriaxone and flagyl. Patient tolerating soft bland diet without nausea or vomiting, diarrhea improved only two episodes of soft stool since yesterday. Continue Cefpodoxime 200mg BID for 5 days (9/24-9/28) and Metronidazole 500mg BID for 5 days (9/24-9/28).  Patient has an outpatient follow up with GI.      # Acute appendicitis.  CT A&P concerning for appendicitis, patient afebrile, no leukocytosis.  Surgery assessed patient and deemed symptoms more likely colitis not appendicitis.  Patient was provided images from her scan and has followup with her gastroenterologist on 9/29.    # Chronic abdominal pain with intermittent diarrhea.  Given patient's 10 year history of intermittent abdominal pain accompanied by constipation, diarrhea, and bloody stool there is also concern for IBD. Patient has gastroenterologist with followup appointment on 9/29.    #Asthma. Patient with history of asthma, no wheezing on exam, saturating well on room air.  Continue with home medication Albuterol as needed    #ADHD. Patient with history of ADHD, continue home medication.    Patient was discharged to: home  Changes to old medications: None  Medications that were stopped: None  New medications:   Cefpodoxime 200mg BID for 5 days starting 9/24/21  Metronidazole 500mg BID for 5 days starting 9/24/21    Follow up visit with primary care provider Clara Cordeor NP within 2 weeks  Follow up visit with Corey Gross MD on 9/29/21    VITALS:   T(C): 36.5 (09-24-21 @ 08:45), Max: 36.9 (09-24-21 @ 06:00)  HR: 91 (09-24-21 @ 08:45) (62 - 91)  BP: 110/57 (09-24-21 @ 08:45) (90/57 - 111/76)  RR: 18 (09-24-21 @ 08:45) (18 - 20)  SpO2: 98% (09-24-21 @ 08:45) (94% - 100%)    GENERAL: NAD, lying in bed comfortably  HEAD:  Atraumatic, normocephalic  EYES: EOMI, PERRLA, conjunctiva and sclera clear  ENT: Moist mucous membranes  NECK: Supple, no JVD  HEART: Regular rate and rhythm, no murmurs, rubs, or gallops  LUNGS: Unlabored respirations.  Clear to auscultation bilaterally, no crackles, wheezing, or rhonchi  ABDOMEN: Soft, tender right abdomen,  no RLQ tenderness, Rovsing negative, nondistended, +BS  EXTREMITIES: 2+ peripheral pulses bilaterally. No clubbing, cyanosis, or edema  NERVOUS SYSTEM:  A&Ox3, no focal deficits   SKIN: No rashes or lesions Patient is 42 yo F with past medical history of asthma, ADHD, fibromyalgia, recent travel to [place] presented with abdominal pain, found to have colitis on CT, stool PCR positive for salmonella, patient tolerating soft bland diet without nausea or vomiting, diarrhea improved only two episodes since hospital.     #Colitis. Pt experiencing 4 days acute abdominal pain with 10 episodes/day of water diarrhea during recent travel to [place] secondary to infectious colitis.  CT A&P concerning for colitis, stool PCR positive for salmonella, patient was started on ceftriaxone and flagyl. Patient tolerating soft bland diet without nausea or vomiting, diarrhea improved only two episodes of soft stool since yesterday. Continue Ciprofloxacin 500mg BID for 5 days (9/24-9/28).  Patient has an outpatient follow up with GI.      # Acute appendicitis.  CT A&P concerning for appendicitis, patient afebrile, no leukocytosis.  Surgery assessed patient and deemed symptoms more likely colitis not appendicitis.  Patient was provided images from her scan and has followup with her gastroenterologist on 9/29.    # Chronic abdominal pain with intermittent diarrhea.  Given patient's 10 year history of intermittent abdominal pain accompanied by constipation, diarrhea, and bloody stool there is also concern for IBD. Patient has gastroenterologist with followup appointment on 9/29.    #Asthma. Patient with history of asthma, no wheezing on exam, saturating well on room air.  Continue with home medication Albuterol as needed    #ADHD. Patient with history of ADHD, continue home medication.    Patient was discharged to: home  Changes to old medications: None  Medications that were stopped: None  New medications:   Cefpodoxime 200mg BID for 5 days starting 9/24/21  Metronidazole 500mg BID for 5 days starting 9/24/21    Follow up visit with primary care provider Clara Cordero NP within 2 weeks  Follow up visit with Corey Gross MD on 9/29/21    VITALS:   T(C): 36.5 (09-24-21 @ 08:45), Max: 36.9 (09-24-21 @ 06:00)  HR: 91 (09-24-21 @ 08:45) (62 - 91)  BP: 110/57 (09-24-21 @ 08:45) (90/57 - 111/76)  RR: 18 (09-24-21 @ 08:45) (18 - 20)  SpO2: 98% (09-24-21 @ 08:45) (94% - 100%)    GENERAL: NAD, lying in bed comfortably  HEAD:  Atraumatic, normocephalic  EYES: EOMI, PERRLA, conjunctiva and sclera clear  ENT: Moist mucous membranes  NECK: Supple, no JVD  HEART: Regular rate and rhythm, no murmurs, rubs, or gallops  LUNGS: Unlabored respirations.  Clear to auscultation bilaterally, no crackles, wheezing, or rhonchi  ABDOMEN: Soft, tender right abdomen,  no RLQ tenderness, Rovsing negative, nondistended, +BS  EXTREMITIES: 2+ peripheral pulses bilaterally. No clubbing, cyanosis, or edema  NERVOUS SYSTEM:  A&Ox3, no focal deficits   SKIN: No rashes or lesions

## 2021-09-23 NOTE — H&P ADULT - NSHPLABSRESULTS_GEN_ALL_CORE
13.3   6.18  )-----------( 275      ( 23 Sep 2021 01:52 )             38.3           137  |  103  |  6<L>  ----------------------------<  116<H>  3.7   |  24  |  0.74    Ca    9.3      23 Sep 2021 01:52    TPro  7.0  /  Alb  4.0  /  TBili  0.2  /  DBili  x   /  AST  25  /  ALT  15  /  AlkPhos  86  09-              Urinalysis Basic - ( 23 Sep 2021 01:41 )    Color: Yellow / Appearance: Clear / S.025 / pH: x  Gluc: x / Ketone: 40 mg/dL  / Bili: Negative / Urobili: 0.2 E.U./dL   Blood: x / Protein: NEGATIVE mg/dL / Nitrite: NEGATIVE   Leuk Esterase: NEGATIVE / RBC: x / WBC x   Sq Epi: x / Non Sq Epi: x / Bacteria: x        PT/INR - ( 23 Sep 2021 01:52 )   PT: 12.3 sec;   INR: 1.03          PTT - ( 23 Sep 2021 01:52 )  PTT:28.1 sec    Lactate Trend            CAPILLARY BLOOD GLUCOSE

## 2021-09-23 NOTE — DISCHARGE NOTE PROVIDER - CARE PROVIDER_API CALL
Clara Cordero  Phone: [phone]  Fax: (   )    -  Established Patient  Follow Up Time:     Corey Gross  Phone: [phone]  Fax: (   )    -  Scheduled Appointment: 09/29/2021

## 2021-09-23 NOTE — CONSULT NOTE ADULT - SUBJECTIVE AND OBJECTIVE BOX
HPI:   Judi Escalante is a 41F with pmh of asthma and ADHD and no significant psh who presents to [facility] with 4 day history of RLQ with associated anorexia, nausea, and multiple loose stools. Reports spontaneous onset of symptoms on  while at a resort in [place]. Reports band-like pain along lower abdomen that was severe and crampy in nature with associated anorexia. Reports multiple loose BMs the following day with associated nausea, fatigue, and chills. D/t persistence of symptoms sought care at in the ED where she was diagnosed with acute appendicitis and recommended surgery. [place] was [place] Keflex. Of note, pt reports ongoing workup for IBS d/t intermittent abdominal cramping along with changes in bowel movement and was to undergo colonoscopy on . Denies any recent sick contacts. Denies fevers, vomiting, CP, SOB, melena, hematochezia, dysuria, or recent weightloss.     In the ED initial vitals were all wnl. Initial labs were also wnl. CT A/P notable for inflamed tail end of the appendix suspicious for acute early appendicitis. Thickened terminalileum, possible ileitis. Findings consistent with colitis most apparent in the ascending colon. Was bolused 2L NS and given CTX 1g and flagyl 500 mg.     PMH: Asthma, ADHD  PSH: Tonsillectomy, Bilateral breast reduction  Allergies: NKDA  Medications: Vyvanse, Albuterol  FamHx: No h/o IBD or CRC  SocHx: No h/o of tobacco use. Occasional EtOH use (social events). No h/o recreational drug use    Vital Signs Last 24 Hrs  T(C): 36.4 (23 Sep 2021 01:23), Max: 36.4 (23 Sep 2021 01:23)  T(F): 97.5 (23 Sep 2021 01:23), Max: 97.5 (23 Sep 2021 01:23)A  HR: 93 (23 Sep 2021 01:) (93 - 93)  BP: 121/67 (23 Sep 2021 01:23) (121/67 - 121/67)  BP(mean): --  RR: 18 (23 Sep 2021 01:) (18 - 18)  SpO2: 100% (23 Sep 2021 01:) (100% - 100%)  I&O's Detail    PHYSICAL EXAM  General: NAD, resting comfortably in bed  C/V: NSR  Pulm: Nonlabored breathing, no respiratory distress  Abd: soft, minimally distended, moderately TTP in RLQ with deep palpation. No rebound or guarding.   Extrem: WWP, no edema, SCDs in place  Skin: warm, mild blanching to palpation, no rashes  Psych: calm, appriopriate        LABS:                        13.3   6.18  )-----------( 275      ( 23 Sep 2021 01:52 )             38.3         137  |  103  |  6<L>  ----------------------------<  116<H>  3.7   |  24  |  0.74    Ca    9.3      23 Sep 2021 01:52    TPro  7.0  /  Alb  4.0  /  TBili  0.2  /  DBili  x   /  AST  25  /  ALT  15  /  AlkPhos  86      PT/INR - ( 23 Sep 2021 01:52 )   PT: 12.3 sec;   INR: 1.03          PTT - ( 23 Sep 2021 01:52 )  PTT:28.1 sec  Urinalysis Basic - ( 23 Sep 2021 01:41 )    Color: Yellow / Appearance: Clear / S.025 / pH: x  Gluc: x / Ketone: 40 mg/dL  / Bili: Negative / Urobili: 0.2 E.U./dL   Blood: x / Protein: NEGATIVE mg/dL / Nitrite: NEGATIVE   Leuk Esterase: NEGATIVE / RBC: x / WBC x   Sq Epi: x / Non Sq Epi: x / Bacteria: x        RADIOLOGY & ADDITIONAL STUDIES:  < from: CT Abdomen and Pelvis w/ Oral Cont and w/ IV Cont (21 @ 03:12) >  EXAM:  CT ABDOMEN AND PELVIS OC IC                          PROCEDURE DATE:  2021          INTERPRETATION:  CT SCAN OF ABDOMEN AND PELVIS    History: Right lower quadrant abdominal pain.    Technique: CT scan of abdomen and pelvis was performed from lung bases through symphysis pubis. Intravenous and oral contrast material were utilized. Axial, sagittal and coronal reformatted images were reviewed.    Comparison: None.    Findings:    Lower chest: Normal.    Liver:  Normal.    Gallbladder: No radiopaque stones gallbladder.    Spleen:  Normal.    Pancreas:  Normal.    Adrenal glands:  Normal.    Kidneys: Normal.    Adenopathy:  Mildly prominent mesenteric lymph node in the right abdomen.    Ascites: Small pelvic ascites.    Gastrointestinal tract: Appendix with thickening of the tail measuring up to 7 mm with enhancing walls. Mild to moderate thickening of the terminal ileum. Colonic wall thickening most apparent in the ascending colon consistent with colitis. No obstruction, collection, or pneumoperitoneum.    Vessels: Normal.    Pelvic organs: IUD in satisfactory position.    Soft tissues: Normal.    Bones: Normal.    Impression:    Inflamed tail end of the appendix suspicious for acute early appendicitis.  Thickened terminalileum, possible ileitis.  Findings consistent with colitis most apparent in the ascending colon.  IUD in satisfactory position.    --- End of Report ---          Thank you for the opportunity to participate in the care of this patient.    KHALED AL TAWIL MD; Resident Radiologist  This document has been electronically signed.  GOMEZ FIELDS MD; Attending Radiologist  This document has been electronically signed. Sep 23 2021  4:16AM  
HPI:  41F with PMHx of asthma, ADHD, and fibromyalgia presents for abdominal pain and diarrhea.  Patient reports symptoms started on Sunday, around 4 days ago.  She went to [place] on vacation last week.  Started having RLQ abd pain/tenderness.  Sx worsen with fever and diarrhea the following day, around 15 loose BM, nonbloody.  Seen at a medical facility and was told she had acute appendicitis.  This led [place] to come [place] the [place] for evaluation, if surgery is necessary.  She reports hx of occasional diarrhea, and blood in toilet bowl/TP, pending colonoscopy next week.  Denies use of a/c.  Denies fam hx of CRC and IBD    Allergies    No Known Allergies    Intolerances      Home Medications:  Albuterol (Eqv-ProAir HFA) 90 mcg/inh inhalation aerosol: 2 puff(s) inhaled every 6 hours, As Needed (23 Sep 2021 09:15)  Vyvanse 70 mg oral capsule: 1 cap(s) orally once a day (in the morning) (23 Sep 2021 09:15)    MEDICATIONS:  MEDICATIONS  (STANDING):  metroNIDAZOLE  IVPB 500 milliGRAM(s) IV Intermittent every 8 hours    MEDICATIONS  (PRN):  acetaminophen   Tablet .. 650 milliGRAM(s) Oral every 6 hours PRN Moderate Pain (4 - 6)  ALBUTerol    90 MICROgram(s) HFA Inhaler 1 Puff(s) Inhalation every 6 hours PRN Shortness of Breath  ondansetron Injectable 4 milliGRAM(s) IV Push every 8 hours PRN Nausea and/or Vomiting    PAST MEDICAL & SURGICAL HISTORY:  Asthma    ADHD    H/O bilateral breast reduction surgery      FAMILY HISTORY:  Denies fam hx of CRC and IBD    SOCIAL HISTORY:  Tobacco: denies  Alcohol: social  Illicit Drugs:denies    REVIEW OF SYSTEMS:  All other 10 review of systems is negative except as indicated in HPI    Vital Signs Last 24 Hrs  T(C): 36.6 (23 Sep 2021 10:40), Max: 37 (23 Sep 2021 07:04)  T(F): 97.8 (23 Sep 2021 10:40), Max: 98.6 (23 Sep 2021 07:04)  HR: 98 (23 Sep 2021 10:40) (86 - 98)  BP: 136/70 (23 Sep 2021 10:40) (102/65 - 136/70)  BP(mean): --  RR: 18 (23 Sep 2021 10:40) (17 - 18)  SpO2: 100% (23 Sep 2021 10:40) (100% - 100%)      PHYSICAL EXAM:    General: Well developed; well nourished; in no acute distress  Eyes: moist conjunctivae, sclerae anicteric  HENT: Moist mucous membranes, tongue midline  Neck: Trachea midline, supple  Lungs: Normal respiratory effort and no intercostal retractions  Cardiovascular: RRR, S1S2  Abdomen: Soft, mild tenderness of the RUQ and RLQ, non-distended; Normal bowel sounds; No rebound or guarding  Extremities: Normal range of motion, No clubbing, cyanosis or edema  Neurological: Alert and oriented x3, nonfocal exam  Skin: Warm and dry. No obvious rash    LABS:                        13.3   6.18  )-----------( 275      ( 23 Sep 2021 01:52 )             38.3     09-23    137  |  103  |  6<L>  ----------------------------<  116<H>  3.7   |  24  |  0.74    Ca    9.3      23 Sep 2021 01:52    TPro  7.0  /  Alb  4.0  /  TBili  0.2  /  DBili  x   /  AST  25  /  ALT  15  /  AlkPhos  86  09-23        PT/INR - ( 23 Sep 2021 01:52 )   PT: 12.3 sec;   INR: 1.03          PTT - ( 23 Sep 2021 01:52 )  PTT:28.1 sec    RADIOLOGY & ADDITIONAL STUDIES:   reviewed
HPI:   Judi Escalante is a 41F with pmh of asthma and ADHD and no significant psh who presents to [facility] with 4 day history of RLQ with associated anorexia, nausea, and multiple loose stools. Reports spontaneous onset of symptoms on  while at a resort in [place]. Reports band-like pain along lower abdomen that was severe and crampy in nature with associated anorexia. Reports multiple loose BMs the following day with associated nausea, fatigue, and chills. D/t persistence of symptoms sought care at in the ED where she was diagnosed with acute appendicitis and recommended surgery. [place] Keflex. Of note, pt reports ongoing workup for IBS d/t intermittent abdominal cramping along with changes in bowel movement and was to undergo colonoscopy on . Denies any recent sick contacts. Denies fevers, vomiting, CP, SOB, melena, hematochezia, dysuria, or recent weightloss.     In the ED initial vitals were all wnl. Initial labs were also wnl. CT A/P notable for inflamed tail end of the appendix suspicious for acute early appendicitis. Thickened terminalileum, possible ileitis. Findings consistent with colitis most apparent in the ascending colon. Was bolused 2L NS and given CTX 1g and flagyl 500 mg.     PMH: Asthma, ADHD  PSH: Tonsillectomy, Bilateral breast reduction  Allergies: NKDA  Medications: Vyvanse, Albuterol  FamHx: No h/o IBD or CRC  SocHx: No h/o of tobacco use. Occasional EtOH use (social events). No h/o recreational drug use    Vital Signs Last 24 Hrs  T(C): 36.4 (23 Sep 2021 01:23), Max: 36.4 (23 Sep 2021 01:23)  T(F): 97.5 (23 Sep 2021 01:23), Max: 97.5 (23 Sep 2021 01:23)A  HR: 93 (23 Sep 2021 01:) (93 - 93)  BP: 121/67 (23 Sep 2021 01:23) (121/67 - 121/67)  BP(mean): --  RR: 18 (23 Sep 2021 01:) (18 - 18)  SpO2: 100% (23 Sep 2021 01:) (100% - 100%)  I&O's Detail    PHYSICAL EXAM  General: NAD, resting comfortably in bed  C/V: NSR  Pulm: Nonlabored breathing, no respiratory distress  Abd: soft, minimally distended, moderately TTP in RLQ with deep palpation. No rebound or guarding.   Extrem: WWP, no edema, SCDs in place  Skin: warm, mild blanching to palpation, no rashes  Psych: calm, appriopriate        LABS:                        13.3   6.18  )-----------( 275      ( 23 Sep 2021 01:52 )             38.3         137  |  103  |  6<L>  ----------------------------<  116<H>  3.7   |  24  |  0.74    Ca    9.3      23 Sep 2021 01:52    TPro  7.0  /  Alb  4.0  /  TBili  0.2  /  DBili  x   /  AST  25  /  ALT  15  /  AlkPhos  86      PT/INR - ( 23 Sep 2021 01:52 )   PT: 12.3 sec;   INR: 1.03          PTT - ( 23 Sep 2021 01:52 )  PTT:28.1 sec  Urinalysis Basic - ( 23 Sep 2021 01:41 )    Color: Yellow / Appearance: Clear / S.025 / pH: x  Gluc: x / Ketone: 40 mg/dL  / Bili: Negative / Urobili: 0.2 E.U./dL   Blood: x / Protein: NEGATIVE mg/dL / Nitrite: NEGATIVE   Leuk Esterase: NEGATIVE / RBC: x / WBC x   Sq Epi: x / Non Sq Epi: x / Bacteria: x        RADIOLOGY & ADDITIONAL STUDIES:  < from: CT Abdomen and Pelvis w/ Oral Cont and w/ IV Cont (21 @ 03:12) >  EXAM:  CT ABDOMEN AND PELVIS OC IC                          PROCEDURE DATE:  2021          INTERPRETATION:  CT SCAN OF ABDOMEN AND PELVIS    History: Right lower quadrant abdominal pain.    Technique: CT scan of abdomen and pelvis was performed from lung bases through symphysis pubis. Intravenous and oral contrast material were utilized. Axial, sagittal and coronal reformatted images were reviewed.    Comparison: None.    Findings:    Lower chest: Normal.    Liver:  Normal.    Gallbladder: No radiopaque stones gallbladder.    Spleen:  Normal.    Pancreas:  Normal.    Adrenal glands:  Normal.    Kidneys: Normal.    Adenopathy:  Mildly prominent mesenteric lymph node in the right abdomen.    Ascites: Small pelvic ascites.    Gastrointestinal tract: Appendix with thickening of the tail measuring up to 7 mm with enhancing walls. Mild to moderate thickening of the terminal ileum. Colonic wall thickening most apparent in the ascending colon consistent with colitis. No obstruction, collection, or pneumoperitoneum.    Vessels: Normal.    Pelvic organs: IUD in satisfactory position.    Soft tissues: Normal.    Bones: Normal.    Impression:    Inflamed tail end of the appendix suspicious for acute early appendicitis.  Thickened terminalileum, possible ileitis.  Findings consistent with colitis most apparent in the ascending colon.  IUD in satisfactory position.    --- End of Report ---          Thank you for the opportunity to participate in the care of this patient.    KHALED AL TAWIL MD; Resident Radiologist  This document has been electronically signed.  GOMEZ FIELDS MD; Attending Radiologist  This document has been electronically signed. Sep 23 2021  4:16AM

## 2021-09-23 NOTE — ED PROVIDER NOTE - OBJECTIVE STATEMENT
41F hx asthma, adhd, c/o abd pain. pt states pain ongoing for past 3 days.  pt states pain mostly to RLQ.  +nausea. no vomiting. states had multiple episodes loose stool.  no fever.  pt states while in [place] she was taken to the hospital there and they told her it could be appendicitis.  pt given keflex, buscopan.  no dysuria.

## 2021-09-23 NOTE — H&P ADULT - PROBLEM SELECTOR PLAN 4
F: None  E: replete PRN  N: NPO for now  DVT ppx: Lovenox F: None  E: replete PRN  N: NPO for now, advance to CLD  DVT ppx: SCDs F: None  E: replete PRN  N: CLD  DVT ppx: SCDs

## 2021-09-23 NOTE — DISCHARGE NOTE PROVIDER - NSDCFUADDAPPT_GEN_ALL_CORE_FT
Please see your PCP Clara Cordero NP within the next 2 weeks.  Please see your gastroenterologist Corey Gross MD for your appointment on 9/29/21

## 2021-09-24 VITALS
HEART RATE: 91 BPM | RESPIRATION RATE: 18 BRPM | TEMPERATURE: 98 F | OXYGEN SATURATION: 98 % | DIASTOLIC BLOOD PRESSURE: 57 MMHG | SYSTOLIC BLOOD PRESSURE: 110 MMHG

## 2021-09-24 LAB
ALBUMIN SERPL ELPH-MCNC: 3.7 G/DL — SIGNIFICANT CHANGE UP (ref 3.3–5)
ALP SERPL-CCNC: 68 U/L — SIGNIFICANT CHANGE UP (ref 40–120)
ALT FLD-CCNC: 10 U/L — SIGNIFICANT CHANGE UP (ref 10–45)
ANION GAP SERPL CALC-SCNC: 8 MMOL/L — SIGNIFICANT CHANGE UP (ref 5–17)
AST SERPL-CCNC: 16 U/L — SIGNIFICANT CHANGE UP (ref 10–40)
BASOPHILS # BLD AUTO: 0.04 K/UL — SIGNIFICANT CHANGE UP (ref 0–0.2)
BASOPHILS NFR BLD AUTO: 0.7 % — SIGNIFICANT CHANGE UP (ref 0–2)
BILIRUB SERPL-MCNC: <0.2 MG/DL — SIGNIFICANT CHANGE UP (ref 0.2–1.2)
BUN SERPL-MCNC: 5 MG/DL — LOW (ref 7–23)
C TRACH RRNA SPEC QL NAA+PROBE: SIGNIFICANT CHANGE UP
CALCIUM SERPL-MCNC: 9.1 MG/DL — SIGNIFICANT CHANGE UP (ref 8.4–10.5)
CHLORIDE SERPL-SCNC: 109 MMOL/L — HIGH (ref 96–108)
CO2 SERPL-SCNC: 24 MMOL/L — SIGNIFICANT CHANGE UP (ref 22–31)
COVID-19 SPIKE DOMAIN AB INTERP: POSITIVE
COVID-19 SPIKE DOMAIN ANTIBODY RESULT: >250 U/ML — HIGH
CREAT SERPL-MCNC: 0.79 MG/DL — SIGNIFICANT CHANGE UP (ref 0.5–1.3)
CULTURE RESULTS: SIGNIFICANT CHANGE UP
EOSINOPHIL # BLD AUTO: 0.1 K/UL — SIGNIFICANT CHANGE UP (ref 0–0.5)
EOSINOPHIL NFR BLD AUTO: 1.7 % — SIGNIFICANT CHANGE UP (ref 0–6)
GLUCOSE SERPL-MCNC: 91 MG/DL — SIGNIFICANT CHANGE UP (ref 70–99)
HCT VFR BLD CALC: 37.9 % — SIGNIFICANT CHANGE UP (ref 34.5–45)
HGB BLD-MCNC: 13.3 G/DL — SIGNIFICANT CHANGE UP (ref 11.5–15.5)
IMM GRANULOCYTES NFR BLD AUTO: 0.3 % — SIGNIFICANT CHANGE UP (ref 0–1.5)
LYMPHOCYTES # BLD AUTO: 2.58 K/UL — SIGNIFICANT CHANGE UP (ref 1–3.3)
LYMPHOCYTES # BLD AUTO: 43.5 % — SIGNIFICANT CHANGE UP (ref 13–44)
MAGNESIUM SERPL-MCNC: 2.1 MG/DL — SIGNIFICANT CHANGE UP (ref 1.6–2.6)
MCHC RBC-ENTMCNC: 32.2 PG — SIGNIFICANT CHANGE UP (ref 27–34)
MCHC RBC-ENTMCNC: 35.1 GM/DL — SIGNIFICANT CHANGE UP (ref 32–36)
MCV RBC AUTO: 91.8 FL — SIGNIFICANT CHANGE UP (ref 80–100)
MONOCYTES # BLD AUTO: 0.57 K/UL — SIGNIFICANT CHANGE UP (ref 0–0.9)
MONOCYTES NFR BLD AUTO: 9.6 % — SIGNIFICANT CHANGE UP (ref 2–14)
N GONORRHOEA RRNA SPEC QL NAA+PROBE: SIGNIFICANT CHANGE UP
NEUTROPHILS # BLD AUTO: 2.62 K/UL — SIGNIFICANT CHANGE UP (ref 1.8–7.4)
NEUTROPHILS NFR BLD AUTO: 44.2 % — SIGNIFICANT CHANGE UP (ref 43–77)
NRBC # BLD: 0 /100 WBCS — SIGNIFICANT CHANGE UP (ref 0–0)
PLATELET # BLD AUTO: 283 K/UL — SIGNIFICANT CHANGE UP (ref 150–400)
POTASSIUM SERPL-MCNC: 4.1 MMOL/L — SIGNIFICANT CHANGE UP (ref 3.5–5.3)
POTASSIUM SERPL-SCNC: 4.1 MMOL/L — SIGNIFICANT CHANGE UP (ref 3.5–5.3)
PROT SERPL-MCNC: 6.3 G/DL — SIGNIFICANT CHANGE UP (ref 6–8.3)
RBC # BLD: 4.13 M/UL — SIGNIFICANT CHANGE UP (ref 3.8–5.2)
RBC # FLD: 12.3 % — SIGNIFICANT CHANGE UP (ref 10.3–14.5)
SARS-COV-2 IGG+IGM SERPL QL IA: >250 U/ML — HIGH
SARS-COV-2 IGG+IGM SERPL QL IA: POSITIVE
SODIUM SERPL-SCNC: 141 MMOL/L — SIGNIFICANT CHANGE UP (ref 135–145)
SPECIMEN SOURCE: SIGNIFICANT CHANGE UP
SPECIMEN SOURCE: SIGNIFICANT CHANGE UP
WBC # BLD: 5.93 K/UL — SIGNIFICANT CHANGE UP (ref 3.8–10.5)
WBC # FLD AUTO: 5.93 K/UL — SIGNIFICANT CHANGE UP (ref 3.8–10.5)

## 2021-09-24 PROCEDURE — 83690 ASSAY OF LIPASE: CPT

## 2021-09-24 PROCEDURE — 86900 BLOOD TYPING SEROLOGIC ABO: CPT

## 2021-09-24 PROCEDURE — G1004: CPT

## 2021-09-24 PROCEDURE — 87591 N.GONORRHOEAE DNA AMP PROB: CPT

## 2021-09-24 PROCEDURE — 81003 URINALYSIS AUTO W/O SCOPE: CPT

## 2021-09-24 PROCEDURE — 85730 THROMBOPLASTIN TIME PARTIAL: CPT

## 2021-09-24 PROCEDURE — 74177 CT ABD & PELVIS W/CONTRAST: CPT | Mod: MG

## 2021-09-24 PROCEDURE — 87045 FECES CULTURE AEROBIC BACT: CPT

## 2021-09-24 PROCEDURE — 87186 SC STD MICRODIL/AGAR DIL: CPT

## 2021-09-24 PROCEDURE — 86850 RBC ANTIBODY SCREEN: CPT

## 2021-09-24 PROCEDURE — 99233 SBSQ HOSP IP/OBS HIGH 50: CPT | Mod: GC

## 2021-09-24 PROCEDURE — 93005 ELECTROCARDIOGRAM TRACING: CPT

## 2021-09-24 PROCEDURE — 96375 TX/PRO/DX INJ NEW DRUG ADDON: CPT

## 2021-09-24 PROCEDURE — 83735 ASSAY OF MAGNESIUM: CPT

## 2021-09-24 PROCEDURE — 85652 RBC SED RATE AUTOMATED: CPT

## 2021-09-24 PROCEDURE — 80053 COMPREHEN METABOLIC PANEL: CPT

## 2021-09-24 PROCEDURE — 87086 URINE CULTURE/COLONY COUNT: CPT

## 2021-09-24 PROCEDURE — 87046 STOOL CULTR AEROBIC BACT EA: CPT

## 2021-09-24 PROCEDURE — 81025 URINE PREGNANCY TEST: CPT

## 2021-09-24 PROCEDURE — 99285 EMERGENCY DEPT VISIT HI MDM: CPT | Mod: 25

## 2021-09-24 PROCEDURE — 83630 LACTOFERRIN FECAL (QUAL): CPT

## 2021-09-24 PROCEDURE — 86140 C-REACTIVE PROTEIN: CPT

## 2021-09-24 PROCEDURE — 85610 PROTHROMBIN TIME: CPT

## 2021-09-24 PROCEDURE — 99231 SBSQ HOSP IP/OBS SF/LOW 25: CPT | Mod: GC

## 2021-09-24 PROCEDURE — 96374 THER/PROPH/DIAG INJ IV PUSH: CPT

## 2021-09-24 PROCEDURE — 87507 IADNA-DNA/RNA PROBE TQ 12-25: CPT

## 2021-09-24 PROCEDURE — 87491 CHLMYD TRACH DNA AMP PROBE: CPT

## 2021-09-24 PROCEDURE — 86769 SARS-COV-2 COVID-19 ANTIBODY: CPT

## 2021-09-24 PROCEDURE — 36415 COLL VENOUS BLD VENIPUNCTURE: CPT

## 2021-09-24 PROCEDURE — 83993 ASSAY FOR CALPROTECTIN FECAL: CPT

## 2021-09-24 PROCEDURE — 87635 SARS-COV-2 COVID-19 AMP PRB: CPT

## 2021-09-24 PROCEDURE — 87177 OVA AND PARASITES SMEARS: CPT

## 2021-09-24 PROCEDURE — 85025 COMPLETE CBC W/AUTO DIFF WBC: CPT

## 2021-09-24 PROCEDURE — 86901 BLOOD TYPING SEROLOGIC RH(D): CPT

## 2021-09-24 RX ORDER — METRONIDAZOLE 500 MG
1 TABLET ORAL
Qty: 10 | Refills: 0
Start: 2021-09-24 | End: 2021-09-28

## 2021-09-24 RX ORDER — CEFPODOXIME PROXETIL 100 MG
1 TABLET ORAL
Qty: 10 | Refills: 0
Start: 2021-09-24 | End: 2021-09-28

## 2021-09-24 RX ORDER — CIPROFLOXACIN LACTATE 400MG/40ML
1 VIAL (ML) INTRAVENOUS
Qty: 10 | Refills: 0
Start: 2021-09-24 | End: 2021-09-28

## 2021-09-24 RX ADMIN — CEFTRIAXONE 100 MILLIGRAM(S): 500 INJECTION, POWDER, FOR SOLUTION INTRAMUSCULAR; INTRAVENOUS at 03:52

## 2021-09-24 RX ADMIN — DEXTROAMPHETAMINE SACCHARATE, AMPHETAMINE ASPARTATE, DEXTROAMPHETAMINE SULFATE AND AMPHETAMINE SULFATE 30 MILLIGRAM(S): 1.875; 1.875; 1.875; 1.875 TABLET ORAL at 09:33

## 2021-09-24 RX ADMIN — Medication 500 MILLIGRAM(S): at 14:15

## 2021-09-24 RX ADMIN — Medication 500 MILLIGRAM(S): at 06:53

## 2021-09-24 NOTE — PROGRESS NOTE ADULT - SUBJECTIVE AND OBJECTIVE BOX
CRS Attending  Patient seen and examined on rounds  Feels improved  Stools more formed  Tolerating PO    Vital Signs Last 24 Hrs  T(C): 36.5 (24 Sep 2021 08:45), Max: 36.9 (24 Sep 2021 06:00)  T(F): 97.7 (24 Sep 2021 08:45), Max: 98.4 (24 Sep 2021 06:00)  HR: 91 (24 Sep 2021 08:45) (62 - 91)  BP: 110/57 (24 Sep 2021 08:45) (90/57 - 111/76)  BP(mean): --  RR: 18 (24 Sep 2021 08:45) (18 - 20)  SpO2: 98% (24 Sep 2021 08:45) (94% - 100%)    I&O's Detail    23 Sep 2021 07:01  -  24 Sep 2021 07:00  --------------------------------------------------------  IN:    Oral Fluid: 700 mL  Total IN: 700 mL    OUT:    Voided (mL): 1 mL  Total OUT: 1 mL    Total NET: 699 mL      Gen NAD  Abdomen soft nondistended nontender                          13.3   5.93  )-----------( 283      ( 24 Sep 2021 06:42 )             37.9   09-24    141  |  109<H>  |  5<L>  ----------------------------<  91  4.1   |  24  |  0.79    Ca    9.1      24 Sep 2021 06:42  Mg     2.1     09-24    TPro  6.3  /  Alb  3.7  /  TBili  <0.2  /  DBili  x   /  AST  16  /  ALT  10  /  AlkPhos  68  09-24

## 2021-09-24 NOTE — PROGRESS NOTE ADULT - ASSESSMENT
GI input appreciated  Continue care and dispo planning as per primary team   Patient has outpatient GI appointment scheduled, importance of follow up with her GI reinforced

## 2021-09-24 NOTE — DISCHARGE NOTE NURSING/CASE MANAGEMENT/SOCIAL WORK - PATIENT PORTAL LINK FT
You can access the FollowMyHealth Patient Portal offered by [facility] by registering at the following website: [email or web address]. By joining [facility]’s FollowMyHealth portal, you will also be able to view your health information using other applications (apps) compatible with our system.

## 2021-09-26 LAB
-  CEFTRIAXONE: SIGNIFICANT CHANGE UP
METHOD TYPE: SIGNIFICANT CHANGE UP

## 2021-09-27 LAB
CULTURE RESULTS: SIGNIFICANT CHANGE UP
ORGANISM # SPEC MICROSCOPIC CNT: SIGNIFICANT CHANGE UP
ORGANISM # SPEC MICROSCOPIC CNT: SIGNIFICANT CHANGE UP
SPECIMEN SOURCE: SIGNIFICANT CHANGE UP

## 2021-09-28 LAB — CALPROTECTIN STL-MCNT: 233 UG/G — HIGH (ref 0–120)

## 2021-09-29 DIAGNOSIS — F90.9 ATTENTION-DEFICIT HYPERACTIVITY DISORDER, UNSPECIFIED TYPE: ICD-10-CM

## 2021-09-29 DIAGNOSIS — K52.9 NONINFECTIVE GASTROENTERITIS AND COLITIS, UNSPECIFIED: ICD-10-CM

## 2021-09-29 DIAGNOSIS — A02.0 SALMONELLA ENTERITIS: ICD-10-CM

## 2021-09-29 DIAGNOSIS — K35.80 UNSPECIFIED ACUTE APPENDICITIS: ICD-10-CM

## 2021-09-29 DIAGNOSIS — K50.10 CROHN'S DISEASE OF LARGE INTESTINE WITHOUT COMPLICATIONS: ICD-10-CM

## 2021-09-29 DIAGNOSIS — J45.909 UNSPECIFIED ASTHMA, UNCOMPLICATED: ICD-10-CM

## 2021-09-29 LAB
CULTURE RESULTS: SIGNIFICANT CHANGE UP
SPECIMEN SOURCE: SIGNIFICANT CHANGE UP

## 2021-12-10 LAB — CULTURE RESULTS: SIGNIFICANT CHANGE UP
